# Patient Record
Sex: FEMALE | Race: BLACK OR AFRICAN AMERICAN | NOT HISPANIC OR LATINO | Employment: FULL TIME | URBAN - METROPOLITAN AREA
[De-identification: names, ages, dates, MRNs, and addresses within clinical notes are randomized per-mention and may not be internally consistent; named-entity substitution may affect disease eponyms.]

---

## 2018-10-26 ENCOUNTER — OUTSIDE PLACE OF SERVICE (OUTPATIENT)
Dept: CARDIOLOGY | Facility: CLINIC | Age: 48
End: 2018-10-26
Payer: COMMERCIAL

## 2018-10-26 PROCEDURE — 93010 ELECTROCARDIOGRAM REPORT: CPT | Mod: S$GLB,,, | Performed by: INTERNAL MEDICINE

## 2019-07-08 ENCOUNTER — TELEPHONE (OUTPATIENT)
Dept: OTOLARYNGOLOGY | Facility: CLINIC | Age: 49
End: 2019-07-08

## 2019-07-08 NOTE — TELEPHONE ENCOUNTER
----- Message from Shonna Lindsey sent at 7/8/2019 10:40 AM CDT -----  Contact: PATIENT'S MOTHER / CARMEN SANTO   Name of Who is Calling: PATIENT'S MOTHER / CARMEN SANTO     What is the request in detail:   Patient's mother called requesting to get a sooner appointment for her daughter preferably Friday 07/19/2019. Please give a call back at your earliest convenience.       THANKS!    Can the clinic reply by [facility]:  No      Number to Call Back:  PATIENT'S MOTHER / CARMEN SANTO / # [phone] / # [phone]

## 2020-09-25 ENCOUNTER — OFFICE VISIT (OUTPATIENT)
Dept: URGENT CARE | Facility: CLINIC | Age: 50
End: 2020-09-25
Payer: COMMERCIAL

## 2020-09-25 VITALS
SYSTOLIC BLOOD PRESSURE: 133 MMHG | OXYGEN SATURATION: 99 % | BODY MASS INDEX: 24.1 KG/M2 | HEIGHT: 63 IN | DIASTOLIC BLOOD PRESSURE: 82 MMHG | WEIGHT: 136 LBS | RESPIRATION RATE: 18 BRPM | HEART RATE: 93 BPM

## 2020-09-25 DIAGNOSIS — T19.2XXA VAGINAL FOREIGN BODY, INITIAL ENCOUNTER: Primary | ICD-10-CM

## 2020-09-25 PROCEDURE — 99203 OFFICE O/P NEW LOW 30 MIN: CPT | Mod: S$GLB,,, | Performed by: NURSE PRACTITIONER

## 2020-09-25 PROCEDURE — 99203 PR OFFICE/OUTPT VISIT, NEW, LEVL III, 30-44 MIN: ICD-10-PCS | Mod: S$GLB,,, | Performed by: NURSE PRACTITIONER

## 2020-09-25 RX ORDER — ALPRAZOLAM 0.5 MG/1
TABLET ORAL
COMMUNITY
Start: 2020-09-25

## 2020-09-26 NOTE — PATIENT INSTRUCTIONS
After examining vagina, cervix, and cervical os, there is no visible sign of tampon or tampon string. If you have any signs of infection, including but not limited to fever, body aches, etc. Please go to the nearest ED for further evaluation.   We do not have ultrasound available in urgent care to further exam your cervix or internal organs. If you feel the need for further evaluation please go to the ED.     1.  Take all medications as directed. If you have been prescribed antibiotics, make sure to complete them.   2.  Rest and keep yourself/patient well hydrated. For adults, it is recommended to drink at least 8-10 glasses of water daily.   3.  For patients above 6 months of age who are not allergic to and are not on anticoagulants, you can alternate Tylenol and Motrin every 4-6 hours for fever above 100.4F and/or pain.  For patients less than 6 months of age, allergic to or intolerant to NSAIDS, have gastritis, gastric ulcers, or history of GI bleeds, are pregnant, or are on anticoagulant therapy, you can take Tylenol every 4 hours as needed for fever above 100.4F and/or pain.   4. You should schedule a follow-up appointment with your Primary Care Provider/Pediatrician for recheck in 2-3 days or as directed at this visit.   5.  If your condition fails to improve in a timely manner, you should receive another evaluation by your Primary Care Provider/Pediatrician to discuss your concerns or return to urgent care for a recheck.  If your condition worsens at any time, you should report immediately to your nearest Emergency Department for further evaluation. **You must understand that you have received Urgent Care treatment only and that you may be released before all of your medical problems are known or treated. You, the patient, are responsible to arrange for follow-up care as instructed.         For Girls: About Sanitary Pads and Tampons  During your period, pads and tampons absorb the flow and protect your  clothes from stains. They come in many styles. Some absorb more than others. To learn how to use them, read the package instructions and check with an adult.  Pads or tampons?  How do you choose between pads and tampons? Give each of them a try, then decide which you like best. Its up to you. Dont forget: You can buy both. Many girls do. Then you always have a choice. You can wear a pad or a tampon depending on:  · How youre feeling.  · What youll be doing (like dancing, swimming, or running).  · What youll be wearing (such as a leotard, a swimsuit, or sweat pants).  Trying tampons?  If you think you want to try tampons, start by using slender, smaller tampons. You may want to choose 1 in which the applicator is curved at the end. These may be easier to insert. You may want to be at home when you put in your first tampon. Pick a time when you arent in a hurry. Remember to relax.  Health alert!  If youre not leaking, its tempting to leave a tampon in all day. But this is not a good idea. It can cause health problems, some of them very serious. Be sure to remove the last tampon you use with each period!   About pads  Most pads have a sticky strip that clings to panties. Pads are made of soft material and lined with plastic to help prevent leaks. Pads may be shaped like rectangles or ovals. They can be thin or thick. Some pads come wrapped individually. These are easy to tuck in a purse, backpack, or locker. You can put an unwrapped pad in a baggie to keep it clean.  About tampons  Tampons are worn inside your vagina. Some come in cardboard or plastic tubes to help you insert them. Dont worry: Tampons wont get lost inside your body. Once a tampon is inside you, the muscles of your vagina hold it safely in place. Each tampon has a string attached to help you pull it out later.  Change every few hours  Change pads and tampons every 4 to 8 hours, more often if needed. This helps you avoid leaks and odor. Always  wrap a used pad (toilet paper works well) and throw it out. Never flush pads down the toilet! Some tampons and their tubes are flushable. Check package directions.  What about at night?  Your flow may be heavier or lighter at night. You can wear a pad, a tampon, or both. Don't forget to change your pad or tampon immediately when you wake up. Use whatever is most comfortable for you  Date Last Reviewed: 5/9/2015  © 1728-9152 [employer]. [address] [place] [address], [place] [address]. All rights reserved. This information is not intended as a substitute for professional medical care. Always follow your healthcare professional's instructions.

## 2020-09-26 NOTE — PROGRESS NOTES
"Subjective:       Patient ID: Nivia Wetzel is a 50 y.o. female.    Vitals:  height is 5' 3" (1.6 m) and weight is 61.7 kg (136 lb). Her blood pressure is 133/82 and her pulse is 93. Her respiration is 18 and oxygen saturation is 99%.     Chief Complaint: Foreign Body(tampon) Removal    Pt reports inserting a tampon this morning around 10 am. Pt states she is unsure if she removed it or not, reports she can not remember. Pt is denying pain and foul vaginal odor.     Other  This is a new problem. The current episode started today. The problem occurs constantly. The problem has been unchanged. Pertinent negatives include no abdominal pain, anorexia, arthralgias, change in bowel habit, chest pain, chills, congestion, coughing, diaphoresis, fatigue, fever, headaches, joint swelling, myalgias, nausea, neck pain, numbness, rash, sore throat, swollen glands, urinary symptoms, vertigo, visual change, vomiting or weakness. Nothing aggravates the symptoms. She has tried nothing for the symptoms. The treatment provided no relief.       Constitution: Negative for chills, sweating, fatigue and fever.   HENT: Negative for congestion and sore throat.    Neck: Negative for neck pain and painful lymph nodes.   Cardiovascular: Negative for chest pain.   Respiratory: Negative for cough.    Gastrointestinal: Negative for abdominal pain, nausea and vomiting.   Genitourinary: Negative for dysuria, frequency, urgency, urine decreased, flank pain, bladder incontinence, bed wetting, hematuria, history of kidney stones, painful menstruation, irregular menstruation, missed menses, heavy menstrual bleeding, ovarian cysts, genital trauma, vaginal pain, vaginal discharge, vaginal bleeding, vaginal odor, painful intercourse, genital sore, painful ejaculation and pelvic pain.        Possible tampon     Musculoskeletal: Negative for joint pain, joint swelling, back pain and muscle ache.   Skin: Negative for rash and lesion.   Neurological: " Negative for history of vertigo, headaches and numbness.   Hematologic/Lymphatic: Negative for swollen lymph nodes.       Objective:      Physical Exam   Constitutional: She is oriented to person, place, and time. She appears well-developed.  Non-toxic appearance. She does not appear ill. No distress.   HENT:   Head: Normocephalic and atraumatic.   Ears:   Right Ear: External ear normal.   Left Ear: External ear normal.   Nose: Nose normal. No nasal deformity. No epistaxis.   Mouth/Throat: Oropharynx is clear and moist and mucous membranes are normal.   Eyes: Lids are normal.   Neck: Trachea normal, normal range of motion and phonation normal. Neck supple.   Cardiovascular: Normal pulses.   Pulmonary/Chest: Effort normal.   Abdominal: Soft. Normal appearance and bowel sounds are normal. She exhibits no distension. There is no abdominal tenderness.   Genitourinary:    Cervix normal.      Pelvic exam was performed with patient supine.   There is no tenderness on the right labia. There is no tenderness on the left labia.    Vaginal bleeding present.      No vaginal tenderness.   There is bleeding in the vagina. No tenderness in the vagina.    No foreign body in the vagina.     Lymphadenopathy:     She has no cervical adenopathy.   Neurological: She is alert and oriented to person, place, and time.   Skin: Skin is warm, dry, intact and not diaphoretic. Psychiatric: Her speech is normal and behavior is normal.   Nursing note and vitals reviewed.        Assessment:       1. Vaginal foreign body, initial encounter        Plan:         Vaginal foreign body, initial encounter    Vaginal exam reveals no retained foreign body. Pt advised to monitor for signs of infection. Pt verbalized understanding and reports she may have removed the tampon and could not remember, states she wanted reassurance that the tampon was out.    Patient Instructions     After examining vagina, cervix, and cervical os, there is no visible sign of  tampon or tampon string. If you have any signs of infection, including but not limited to fever, body aches, etc. Please go to the nearest ED for further evaluation.   We do not have ultrasound available in urgent care to further exam your cervix or internal organs. If you feel the need for further evaluation please go to the ED.     1.  Take all medications as directed. If you have been prescribed antibiotics, make sure to complete them.   2.  Rest and keep yourself/patient well hydrated. For adults, it is recommended to drink at least 8-10 glasses of water daily.   3.  For patients above 6 months of age who are not allergic to and are not on anticoagulants, you can alternate Tylenol and Motrin every 4-6 hours for fever above 100.4F and/or pain.  For patients less than 6 months of age, allergic to or intolerant to NSAIDS, have gastritis, gastric ulcers, or history of GI bleeds, are pregnant, or are on anticoagulant therapy, you can take Tylenol every 4 hours as needed for fever above 100.4F and/or pain.   4. You should schedule a follow-up appointment with your Primary Care Provider/Pediatrician for recheck in 2-3 days or as directed at this visit.   5.  If your condition fails to improve in a timely manner, you should receive another evaluation by your Primary Care Provider/Pediatrician to discuss your concerns or return to urgent care for a recheck.  If your condition worsens at any time, you should report immediately to your nearest Emergency Department for further evaluation. **You must understand that you have received Urgent Care treatment only and that you may be released before all of your medical problems are known or treated. You, the patient, are responsible to arrange for follow-up care as instructed.         For Girls: About Sanitary Pads and Tampons  During your period, pads and tampons absorb the flow and protect your clothes from stains. They come in many styles. Some absorb more than others. To  learn how to use them, read the package instructions and check with an adult.  Pads or tampons?  How do you choose between pads and tampons? Give each of them a try, then decide which you like best. Its up to you. Dont forget: You can buy both. Many girls do. Then you always have a choice. You can wear a pad or a tampon depending on:  · How youre feeling.  · What youll be doing (like dancing, swimming, or running).  · What youll be wearing (such as a leotard, a swimsuit, or sweat pants).  Trying tampons?  If you think you want to try tampons, start by using slender, smaller tampons. You may want to choose 1 in which the applicator is curved at the end. These may be easier to insert. You may want to be at home when you put in your first tampon. Pick a time when you arent in a hurry. Remember to relax.  Health alert!  If youre not leaking, its tempting to leave a tampon in all day. But this is not a good idea. It can cause health problems, some of them very serious. Be sure to remove the last tampon you use with each period!   About pads  Most pads have a sticky strip that clings to panties. Pads are made of soft material and lined with plastic to help prevent leaks. Pads may be shaped like rectangles or ovals. They can be thin or thick. Some pads come wrapped individually. These are easy to tuck in a purse, backpack, or locker. You can put an unwrapped pad in a baggie to keep it clean.  About tampons  Tampons are worn inside your vagina. Some come in cardboard or plastic tubes to help you insert them. Dont worry: Tampons wont get lost inside your body. Once a tampon is inside you, the muscles of your vagina hold it safely in place. Each tampon has a string attached to help you pull it out later.  Change every few hours  Change pads and tampons every 4 to 8 hours, more often if needed. This helps you avoid leaks and odor. Always wrap a used pad (toilet paper works well) and throw it out. Never flush pads  down the toilet! Some tampons and their tubes are flushable. Check package directions.  What about at night?  Your flow may be heavier or lighter at night. You can wear a pad, a tampon, or both. Don't forget to change your pad or tampon immediately when you wake up. Use whatever is most comfortable for you  Date Last Reviewed: 5/9/2015  © 0028-9476 [employer]. [address] [place] [address], [place] [address]. All rights reserved. This information is not intended as a substitute for professional medical care. Always follow your healthcare professional's instructions.

## 2020-12-01 ENCOUNTER — CLINICAL SUPPORT (OUTPATIENT)
Dept: URGENT CARE | Facility: CLINIC | Age: 50
End: 2020-12-01
Payer: COMMERCIAL

## 2020-12-01 DIAGNOSIS — Z03.818 ENCOUNTER FOR OBSERVATION FOR SUSPECTED EXPOSURE TO OTHER BIOLOGICAL AGENTS RULED OUT: Primary | ICD-10-CM

## 2020-12-01 LAB
CTP QC/QA: YES
SARS-COV-2 RDRP RESP QL NAA+PROBE: NEGATIVE

## 2020-12-01 PROCEDURE — U0002 COVID-19 LAB TEST NON-CDC: HCPCS | Mod: QW,S$GLB,, | Performed by: PHYSICIAN ASSISTANT

## 2020-12-01 PROCEDURE — U0002: ICD-10-PCS | Mod: QW,S$GLB,, | Performed by: PHYSICIAN ASSISTANT

## 2023-03-18 DIAGNOSIS — Z12.31 SCREENING MAMMOGRAM FOR HIGH-RISK PATIENT: Primary | ICD-10-CM

## 2023-03-18 PROBLEM — Z80.3 FAMILY HISTORY OF BREAST CANCER: Status: ACTIVE | Noted: 2023-03-18

## 2023-04-07 DIAGNOSIS — Z12.31 SCREENING MAMMOGRAM FOR HIGH-RISK PATIENT: Primary | ICD-10-CM

## 2023-04-07 NOTE — ASSESSMENT & PLAN NOTE
Her films are stable based on my review.  As this was done as a screening exam, her films will be reviewed by the Radiologist and compared to her previous films.  Her report will usually be received within 24 hours.  Once received and reviewed - I will phone her with any additional recommendations as needed.    She understands that her imaging and exams have remained stable and is comfortable being followed in a conservative fashion. She understands the importance of monthly self-breast examination and knows to report any and all changes as they occur.

## 2023-04-07 NOTE — PROGRESS NOTES
[facility]  Ethan Bryant MD, FACS  Kandy Fraire NP-C      Chief Complaint:   Nivia Wetzel is a 53 y.o. female presenting today for her 6 month evaluation. She is due for her mammogram.  She reports no interval changes.     History of Present Illness:   Mrs. Wetzel first presented on August 24, 2020 due to her concerns of her future breast cancer risk. She has a family history that is worrisome. Her imaging has been normal. Her ALICJA was calculated in 2020 and found to give her a 25.7% Lifetime Risk of Breast Cancer. MD::: Kaylynn Villa MD.    Past Medical History:   Diagnosis Date    Family history of breast cancer 3/18/2023    Fibroids     History of abnormal cervical Pap smear     Ovarian cyst     Screening mammogram for high-risk patient 3/18/2023      Past Surgical History:   Procedure Laterality Date    ABDOMINOPLASTY      REDUCTION OF BOTH BREASTS      THIGH LIFT      TUMMELANIE ROSAS          Current Outpatient Medications:     ALPRAZolam (XANAX) 0.5 MG tablet, , Disp: , Rfl:    Review of patient's allergies indicates:   Allergen Reactions    Penicillins       Social History     Tobacco Use    Smoking status: Never    Smokeless tobacco: Never   Substance Use Topics    Alcohol use: Yes      Family History   Problem Relation Age of Onset    Hypertension Mother     Breast cancer Mother     Hypertension Father     Breast cancer Paternal Aunt 54    Breast cancer Paternal Aunt 80    Breast cancer Paternal Cousin 75        Review of Systems   Integumentary:  Negative for color change, rash, mole/lesion, breast mass, breast discharge and breast tenderness.   Breast: Negative for mass and tenderness     Physical Exam   HENT:   Head: Normocephalic.   Pulmonary/Chest: Right breast exhibits no inverted nipple, no mass, no nipple discharge, no skin change and no tenderness. Left breast exhibits no inverted nipple, no mass, no nipple discharge, no skin change  and no tenderness. No breast swelling.   Genitourinary: No breast swelling.   Musculoskeletal: Lymphadenopathy:      Upper Body:      Right upper body: No supraclavicular or axillary adenopathy.      Left upper body: No supraclavicular or axillary adenopathy.     Neurological: She is alert.      MAMMOGRAM REPORT: She has some diffuse fibronodular tissue bilaterally; there are no spiculated lesions, distortions or suspicious calcifications noted; NEM    ASSESSMENT and PLAN of CARE    1. Screening mammogram for high-risk patient  Assessment & Plan:  Her films are stable based on my review.  As this was done as a screening exam, her films will be reviewed by the Radiologist and compared to her previous films.  Her report will usually be received within 24 hours.  Once received and reviewed - I will phone her with any additional recommendations as needed.    She understands that her imaging and exams have remained stable and is comfortable being followed in a conservative fashion. She understands the importance of monthly self-breast examination and knows to report any and all changes as they occur.        2. Family history of breast cancer  Assessment & Plan:  We discussed her family history and how it could impact her own future risks.  We discussed family vs. genetic history and the importance of each.  Genetic Counseling/Testing was offered, and all questions answered.        Medical Decision Making:  It is my impression that this patient suffers all conditions contained in this medical document.  Each of these conditions did affect our plan of care and my medical decision making today.  It is my opinion that the medical decision making concerning this patient was of minimal  difficulty based on the aforementioned conditions.  Any further recommendations will be communicated to the patient by me.  I have reviewed and verified her allergies, list of medications, medical and surgical histories, social history, and a  pertinent review of symptoms.     Follow up:  6 months and prn    For:  PE and US with me vs MRI

## 2023-04-17 ENCOUNTER — OFFICE VISIT (OUTPATIENT)
Dept: SURGERY | Facility: CLINIC | Age: 53
End: 2023-04-17
Payer: COMMERCIAL

## 2023-04-17 VITALS — WEIGHT: 121.94 LBS | HEIGHT: 63 IN | BODY MASS INDEX: 21.61 KG/M2

## 2023-04-17 DIAGNOSIS — Z12.31 SCREENING MAMMOGRAM FOR HIGH-RISK PATIENT: Primary | ICD-10-CM

## 2023-04-17 DIAGNOSIS — Z80.3 FAMILY HISTORY OF BREAST CANCER: ICD-10-CM

## 2023-04-17 PROCEDURE — 1159F MED LIST DOCD IN RCRD: CPT | Mod: CPTII,S$GLB,, | Performed by: SPECIALIST

## 2023-04-17 PROCEDURE — 1159F PR MEDICATION LIST DOCUMENTED IN MEDICAL RECORD: ICD-10-PCS | Mod: CPTII,S$GLB,, | Performed by: SPECIALIST

## 2023-04-17 PROCEDURE — 99213 PR OFFICE/OUTPT VISIT, EST, LEVL III, 20-29 MIN: ICD-10-PCS | Mod: S$GLB,,, | Performed by: SPECIALIST

## 2023-04-17 PROCEDURE — 1160F PR REVIEW ALL MEDS BY PRESCRIBER/CLIN PHARMACIST DOCUMENTED: ICD-10-PCS | Mod: CPTII,S$GLB,, | Performed by: SPECIALIST

## 2023-04-17 PROCEDURE — 1160F RVW MEDS BY RX/DR IN RCRD: CPT | Mod: CPTII,S$GLB,, | Performed by: SPECIALIST

## 2023-04-17 PROCEDURE — 99213 OFFICE O/P EST LOW 20 MIN: CPT | Mod: S$GLB,,, | Performed by: SPECIALIST

## 2023-04-17 PROCEDURE — 3008F PR BODY MASS INDEX (BMI) DOCUMENTED: ICD-10-PCS | Mod: CPTII,S$GLB,, | Performed by: SPECIALIST

## 2023-04-17 PROCEDURE — 3008F BODY MASS INDEX DOCD: CPT | Mod: CPTII,S$GLB,, | Performed by: SPECIALIST

## 2023-10-17 ENCOUNTER — TELEPHONE (OUTPATIENT)
Dept: SURGERY | Facility: CLINIC | Age: 53
End: 2023-10-17

## 2023-10-17 NOTE — TELEPHONE ENCOUNTER
----- Message from Maris Suggs sent at 10/17/2023 10:43 AM CDT -----  Contact: Nivia Gonzáles is needing to r/s her US with Dr. Bryant due a family emergency. Please call her at [phone]

## 2023-11-06 NOTE — PROGRESS NOTES
[facility]  Ethan Bryant MD, FACS  Kandy Fraire NP-JESS      Date of Service: 11/20/2023    Chief Complaint:   Nivia Wetzel is a 53 y.o. female presenting today for her 6-month evaluation. She is due for an ultrasound.  She reports no interval changes.     History of Present Illness:   Mrs. Wetzel first presented on August 24, 2020 due to her concerns of her future breast cancer risk. She has a family history that is worrisome. Her imaging has been normal. Her ALICJA was calculated in 2020 and found to give her a 25.7% Lifetime Risk of Breast Cancer. MD::: Kaylynn Villa MD.    Past Medical History:   Diagnosis Date    Family history of breast cancer 3/18/2023    Fibroids     History of abnormal cervical Pap smear     Ovarian cyst     Screening mammogram for high-risk patient 3/18/2023      Past Surgical History:   Procedure Laterality Date    ABDOMINOPLASTY      REDUCTION OF BOTH BREASTS      THIGH LIFT      TUMMY TUCK      UFE          Current Outpatient Medications:     ALPRAZolam (XANAX) 0.5 MG tablet, , Disp: , Rfl:    Review of patient's allergies indicates:   Allergen Reactions    Penicillins       Social History     Tobacco Use    Smoking status: Never    Smokeless tobacco: Never   Substance Use Topics    Alcohol use: Yes      Family History   Problem Relation Age of Onset    Hypertension Mother     Breast cancer Mother     Hypertension Father     Breast cancer Paternal Aunt 54    Breast cancer Paternal Aunt 80    Breast cancer Paternal Cousin 75        Review of Systems   Integumentary:  Negative for color change, rash, mole/lesion, breast mass, breast discharge and breast tenderness.   Breast: Negative for mass and tenderness     Physical Exam   Constitutional: She appears well-developed. She is cooperative.   HENT: Normocephalic.   Cardiovascular:  Normal rate and regular rhythm.            Pulmonary/Chest: She exhibits no tenderness and  no bony tenderness.   Abdominal: Soft. Normal appearance.   Musculoskeletal: Intact with no deficits  Neurological: She is alert.   Skin: No rash noted.   Breast and Chest Wall Evaluation:   Right breast exhibits no mass, no nipple discharge, no skin change and no tenderness.     Left breast exhibits no mass, no nipple discharge, no skin change and no tenderness.     Lymphadenopathy: No supraclavicular or axillary adenopathy.    ULTRASOUND EVALUATION and REPORT    Bilateral real-time Ultrasound was performed by me.  All four quadrants of the breast, the subareolar and axillary basins were scanned.    She has some heterogeneous dense fibroglandular tissue bilaterally.    Right Breast: She has normal tissues in the right breast; there's no disruption of the tissue planes and no abnormal shadowing; she has normal skin thickness with no subcutaneous nodules of skin thickening; NEM     Left Breast: She has normal tissues in the left breast; there's no disruption of the tissue planes and no abnormal shadowing; she has normal skin thickness with no subcutaneous nodules or skin thickening; NEM     Axillae: There are no abnormal lymph nodes seen bilaterally.     Impression: Some dense but normal tissue bilaterally with no solid/suspicious masses noted. No LAD in bilateral axillae.  BIRADS: Category 2 - Benign Finding.    Findings were discussed with patient in real time and a hand written report was given to her at the conclusion of the exam.      ASSESSMENT and PLAN OF CARE     1. Family history of breast cancer  Assessment & Plan:  We discussed her family history and how it could impact her own future risks.  We discussed family vs. genetic history and the importance and implications of each.  Genetic Counseling/Testing was offered, and all questions answered to her satisfaction.  She knows that as additional family members are diagnosed - she will need to let us know as this may change follow up and imaging  recommendations.    We had a discussion concerning Breast Cancer Risk Reduction and current NCCN Guidelines. She knows that her risk can be lowered slightly with a healthy lifestyle and minimal ETOH use. Being physically active will also help. She should reduce or stay away from OCPs and HRT as possible.     We reviewed our findings today and her questions were answered.  She understands that her imaging and exams have remained stable (and show nothing concerning).  She is comfortable being followed in a conservative fashion.      She understands the importance of monthly self-breast examination and knows to report any and all changes as they occur.        Medical Decision Making: It is my impression that the patient suffers from all the listed conditions.  Each of these conditions did affect my Plan of Care and all medical decisions that were rendered.  The medical decision making was of high difficulty based on her co-morbidities and her previous diagnosis of being a High-Risk Patient given her personal and family histories.   I have performed and reviewed all imaging and it has been discussed with her. I have reviewed and verified her allergies, list of medications, medical and surgical histories, social history, and a pertinent review of symptoms.    Follow up: 6 months and prn     For:  Physical Examination and MGM (S) at

## 2023-11-20 ENCOUNTER — OFFICE VISIT (OUTPATIENT)
Dept: SURGERY | Facility: CLINIC | Age: 53
End: 2023-11-20
Payer: COMMERCIAL

## 2023-11-20 DIAGNOSIS — Z80.3 FAMILY HISTORY OF BREAST CANCER: ICD-10-CM

## 2023-11-20 PROCEDURE — 99999 PR PBB SHADOW E&M-EST. PATIENT-LVL II: CPT | Mod: PBBFAC,,, | Performed by: SPECIALIST

## 2023-11-20 PROCEDURE — 1160F PR REVIEW ALL MEDS BY PRESCRIBER/CLIN PHARMACIST DOCUMENTED: ICD-10-PCS | Mod: CPTII,S$GLB,, | Performed by: SPECIALIST

## 2023-11-20 PROCEDURE — 99999 PR PBB SHADOW E&M-EST. PATIENT-LVL II: ICD-10-PCS | Mod: PBBFAC,,, | Performed by: SPECIALIST

## 2023-11-20 PROCEDURE — 1159F PR MEDICATION LIST DOCUMENTED IN MEDICAL RECORD: ICD-10-PCS | Mod: CPTII,S$GLB,, | Performed by: SPECIALIST

## 2023-11-20 PROCEDURE — 1160F RVW MEDS BY RX/DR IN RCRD: CPT | Mod: CPTII,S$GLB,, | Performed by: SPECIALIST

## 2023-11-20 PROCEDURE — 1159F MED LIST DOCD IN RCRD: CPT | Mod: CPTII,S$GLB,, | Performed by: SPECIALIST

## 2023-11-20 PROCEDURE — 99214 OFFICE O/P EST MOD 30 MIN: CPT | Mod: S$GLB,,, | Performed by: SPECIALIST

## 2023-11-20 PROCEDURE — 99214 PR OFFICE/OUTPT VISIT, EST, LEVL IV, 30-39 MIN: ICD-10-PCS | Mod: S$GLB,,, | Performed by: SPECIALIST

## 2023-11-20 RX ORDER — FLUCONAZOLE 150 MG/1
150 TABLET ORAL ONCE
COMMUNITY
Start: 2023-07-20

## 2024-05-07 DIAGNOSIS — Z12.31 SCREENING MAMMOGRAM FOR HIGH-RISK PATIENT: Primary | ICD-10-CM

## 2024-06-16 DIAGNOSIS — Z12.31 SCREENING MAMMOGRAM FOR HIGH-RISK PATIENT: Primary | ICD-10-CM

## 2024-06-25 ENCOUNTER — OFFICE VISIT (OUTPATIENT)
Dept: SURGERY | Facility: CLINIC | Age: 54
End: 2024-06-25
Payer: COMMERCIAL

## 2024-06-25 DIAGNOSIS — Z80.3 FAMILY HISTORY OF BREAST CANCER: Primary | ICD-10-CM

## 2024-06-25 PROCEDURE — 1159F MED LIST DOCD IN RCRD: CPT | Mod: CPTII,S$GLB,, | Performed by: NURSE PRACTITIONER

## 2024-06-25 PROCEDURE — 99213 OFFICE O/P EST LOW 20 MIN: CPT | Mod: S$GLB,,, | Performed by: NURSE PRACTITIONER

## 2024-06-25 PROCEDURE — 99999 PR PBB SHADOW E&M-EST. PATIENT-LVL II: CPT | Mod: PBBFAC,,, | Performed by: NURSE PRACTITIONER

## 2024-06-25 PROCEDURE — 1160F RVW MEDS BY RX/DR IN RCRD: CPT | Mod: CPTII,S$GLB,, | Performed by: NURSE PRACTITIONER

## 2024-06-25 NOTE — PROGRESS NOTES
[facility]  Ethan Bryant MD, FACS  Kandy Fraire NP-C      Date of Service: 6/25/2024    Chief Complaint:   Nivia Wetzel is a 54 y.o. female presenting today for her 6 month evaluation. She is due for her annual mammogram.  She reports no interval changes. She was due in March.     History of Present Illness:   Mrs. Wetzel first presented on August 24, 2020 due to her concerns of her future breast cancer risk. She has a family history that is worrisome. Her imaging has been normal. Her ALICJA was calculated in 2020 and found to give her a 25.7% Lifetime Risk of Breast Cancer. MD::: Kaylynn Villa MD.       Past Medical History:   Diagnosis Date    Family history of breast cancer 3/18/2023    Fibroids     History of abnormal cervical Pap smear     Ovarian cyst     Screening mammogram for high-risk patient 3/18/2023      Past Surgical History:   Procedure Laterality Date    ABDOMINOPLASTY      REDUCTION OF BOTH BREASTS      THIGH LIFT      TUMMY TUCK      UFE          Current Outpatient Medications:     ALPRAZolam (XANAX) 0.5 MG tablet, , Disp: , Rfl:     fluconazole (DIFLUCAN) 150 MG Tab, Take 150 mg by mouth once., Disp: , Rfl:    Review of patient's allergies indicates:   Allergen Reactions    Penicillins       Social History     Tobacco Use    Smoking status: Never    Smokeless tobacco: Never   Substance Use Topics    Alcohol use: Yes      Family History   Problem Relation Name Age of Onset    Hypertension Mother      Breast cancer Mother      Hypertension Father      Breast cancer Paternal Aunt  54    Breast cancer Paternal Aunt  80    Breast cancer Paternal Cousin  75    Breast cancer Paternal Cousin  52        Review of Systems   Integumentary:  Negative for color change, rash, mole/lesion, breast mass, breast discharge and breast tenderness.   Breast: Negative for mass and tenderness       Physical Exam   HENT:   Head: Normocephalic.   Pulmonary/Chest:  Right breast exhibits no inverted nipple, no mass, no nipple discharge, no skin change and no tenderness. Left breast exhibits no inverted nipple, no mass, no nipple discharge, no skin change and no tenderness. No breast swelling.   Genitourinary: No breast swelling.   Musculoskeletal: Lymphadenopathy:      Upper Body:      Right upper body: No supraclavicular or axillary adenopathy.      Left upper body: No supraclavicular or axillary adenopathy.     Neurological: She is alert.        MAMMOGRAM REPORT: Her films are stable based on my review.  As this was done as a screening exam, her films will be reviewed by the Radiologist and compared to her previous films.  Her report will usually be received within 24 hours.  Once received and reviewed - I will phone her with any additional recommendations as needed.    ASSESSMENT and PLAN OF CARE     1. Family history of breast cancer  Assessment & Plan:  We discussed her family history and how it could impact her own future risks.  We discussed family vs. genetic history and the importance and implications of each.  Genetic Counseling/Testing was offered, and all questions answered to her satisfaction.  She knows that as additional family members are diagnosed - she will need to let us know as this may change follow up and imaging recommendations.    We had a discussion concerning Breast Cancer Risk Reduction and current NCCN Guidelines. She knows that her risk can be lowered slightly with a healthy lifestyle and minimal ETOH use. Being physically active will also help. She should reduce or stay away from OCPs and HRT as possible.     We reviewed our findings today and her questions were answered.  She understands that her imaging and exams have remained stable (and show nothing concerning).  She is comfortable being followed in a conservative fashion.      She understands the importance of monthly self-breast examination and knows to report any and all changes as they  occur.      Medical Decision Making: It is my impression that this patient suffers all conditions contained in this medical document.  Each of these conditions did affect our plan of care and my medical decision making today.  It is my opinion that the medical decision making concerning this patient was of minimal  difficulty based on the aforementioned conditions.  Any further recommendations will be communicated to the patient by me.  I have reviewed and verified her allergies, list of medications, medical and surgical histories, social history, and a pertinent review of symptoms.      Follow up:  6 months and prn    For:  Physical Examination and MRI vs Ultrasound      Addendum 6/26/2024 1150: Mammogram- FINDINGS:  There are scattered areas of fibroglandular density. No suspicious   mass, calcification, or architectural distortion of either breast to suggest malignancy. Benign calcifications are noted. Surgical changes of bilateral reduction mammoplasty are noted. IMPRESSION: No mammographic evidence of malignancy. No significant change when compared to prior exam.

## 2024-06-25 NOTE — ASSESSMENT & PLAN NOTE
We discussed her family history and how it could impact her own future risks.  We discussed family vs. genetic history and the importance and implications of each.  Genetic Counseling/Testing was offered, and all questions answered to her satisfaction.  She knows that as additional family members are diagnosed - she will need to let us know as this may change follow up and imaging recommendations.    We had a discussion concerning Breast Cancer Risk Reduction and current NCCN Guidelines. She knows that her risk can be lowered slightly with a healthy lifestyle and minimal ETOH use. Being physically active will also help. She should reduce or stay away from OCPs and HRT as possible.     We reviewed our findings today and her questions were answered.  She understands that her imaging and exams have remained stable (and show nothing concerning).  She is comfortable being followed in a conservative fashion.      She understands the importance of monthly self-breast examination and knows to report any and all changes as they occur.